# Patient Record
Sex: MALE | Race: AMERICAN INDIAN OR ALASKA NATIVE | NOT HISPANIC OR LATINO
[De-identification: names, ages, dates, MRNs, and addresses within clinical notes are randomized per-mention and may not be internally consistent; named-entity substitution may affect disease eponyms.]

---

## 2024-01-27 ENCOUNTER — NON-APPOINTMENT (OUTPATIENT)
Age: 44
End: 2024-01-27

## 2024-02-24 ENCOUNTER — EMERGENCY (EMERGENCY)
Facility: HOSPITAL | Age: 44
LOS: 1 days | Discharge: HOME | End: 2024-02-24
Attending: STUDENT IN AN ORGANIZED HEALTH CARE EDUCATION/TRAINING PROGRAM | Admitting: STUDENT IN AN ORGANIZED HEALTH CARE EDUCATION/TRAINING PROGRAM
Payer: SELF-PAY

## 2024-02-24 VITALS
OXYGEN SATURATION: 99 % | HEART RATE: 87 BPM | HEIGHT: 66 IN | RESPIRATION RATE: 18 BRPM | DIASTOLIC BLOOD PRESSURE: 106 MMHG | WEIGHT: 160.06 LBS | TEMPERATURE: 98 F | SYSTOLIC BLOOD PRESSURE: 164 MMHG

## 2024-02-24 LAB
ALBUMIN SERPL ELPH-MCNC: 4.3 G/DL — SIGNIFICANT CHANGE UP (ref 3.3–5)
ALP SERPL-CCNC: 87 U/L — SIGNIFICANT CHANGE UP (ref 40–120)
ALT FLD-CCNC: 30 U/L — SIGNIFICANT CHANGE UP (ref 12–78)
ANION GAP SERPL CALC-SCNC: 6 MMOL/L — SIGNIFICANT CHANGE UP (ref 5–17)
AST SERPL-CCNC: 17 U/L — SIGNIFICANT CHANGE UP (ref 15–37)
BASOPHILS # BLD AUTO: 0.03 K/UL — SIGNIFICANT CHANGE UP (ref 0–0.2)
BASOPHILS NFR BLD AUTO: 0.4 % — SIGNIFICANT CHANGE UP (ref 0–2)
BILIRUB SERPL-MCNC: 0.4 MG/DL — SIGNIFICANT CHANGE UP (ref 0.2–1.2)
BUN SERPL-MCNC: 12 MG/DL — SIGNIFICANT CHANGE UP (ref 7–23)
CALCIUM SERPL-MCNC: 9.5 MG/DL — SIGNIFICANT CHANGE UP (ref 8.5–10.1)
CHLORIDE SERPL-SCNC: 109 MMOL/L — HIGH (ref 96–108)
CO2 SERPL-SCNC: 27 MMOL/L — SIGNIFICANT CHANGE UP (ref 22–31)
CREAT SERPL-MCNC: 0.97 MG/DL — SIGNIFICANT CHANGE UP (ref 0.5–1.3)
EGFR: 98 ML/MIN/1.73M2 — SIGNIFICANT CHANGE UP
EOSINOPHIL # BLD AUTO: 0.16 K/UL — SIGNIFICANT CHANGE UP (ref 0–0.5)
EOSINOPHIL NFR BLD AUTO: 2.3 % — SIGNIFICANT CHANGE UP (ref 0–6)
ETHANOL SERPL-MCNC: <10 MG/DL — SIGNIFICANT CHANGE UP (ref 0–10)
GLUCOSE SERPL-MCNC: 154 MG/DL — HIGH (ref 70–99)
HCT VFR BLD CALC: 45.4 % — SIGNIFICANT CHANGE UP (ref 39–50)
HGB BLD-MCNC: 15.2 G/DL — SIGNIFICANT CHANGE UP (ref 13–17)
IMM GRANULOCYTES NFR BLD AUTO: 0.3 % — SIGNIFICANT CHANGE UP (ref 0–0.9)
LYMPHOCYTES # BLD AUTO: 2.36 K/UL — SIGNIFICANT CHANGE UP (ref 1–3.3)
LYMPHOCYTES # BLD AUTO: 34.6 % — SIGNIFICANT CHANGE UP (ref 13–44)
MCHC RBC-ENTMCNC: 26.7 PG — LOW (ref 27–34)
MCHC RBC-ENTMCNC: 33.5 GM/DL — SIGNIFICANT CHANGE UP (ref 32–36)
MCV RBC AUTO: 79.6 FL — LOW (ref 80–100)
MONOCYTES # BLD AUTO: 0.44 K/UL — SIGNIFICANT CHANGE UP (ref 0–0.9)
MONOCYTES NFR BLD AUTO: 6.5 % — SIGNIFICANT CHANGE UP (ref 2–14)
NEUTROPHILS # BLD AUTO: 3.81 K/UL — SIGNIFICANT CHANGE UP (ref 1.8–7.4)
NEUTROPHILS NFR BLD AUTO: 55.9 % — SIGNIFICANT CHANGE UP (ref 43–77)
NRBC # BLD: 0 /100 WBCS — SIGNIFICANT CHANGE UP (ref 0–0)
PLATELET # BLD AUTO: 217 K/UL — SIGNIFICANT CHANGE UP (ref 150–400)
POTASSIUM SERPL-MCNC: 4.3 MMOL/L — SIGNIFICANT CHANGE UP (ref 3.5–5.3)
POTASSIUM SERPL-SCNC: 4.3 MMOL/L — SIGNIFICANT CHANGE UP (ref 3.5–5.3)
PROT SERPL-MCNC: 8.1 G/DL — SIGNIFICANT CHANGE UP (ref 6–8.3)
RBC # BLD: 5.7 M/UL — SIGNIFICANT CHANGE UP (ref 4.2–5.8)
RBC # FLD: 13.6 % — SIGNIFICANT CHANGE UP (ref 10.3–14.5)
SODIUM SERPL-SCNC: 142 MMOL/L — SIGNIFICANT CHANGE UP (ref 135–145)
TROPONIN I, HIGH SENSITIVITY RESULT: 3.2 NG/L — SIGNIFICANT CHANGE UP
WBC # BLD: 6.82 K/UL — SIGNIFICANT CHANGE UP (ref 3.8–10.5)
WBC # FLD AUTO: 6.82 K/UL — SIGNIFICANT CHANGE UP (ref 3.8–10.5)

## 2024-02-24 PROCEDURE — 36415 COLL VENOUS BLD VENIPUNCTURE: CPT

## 2024-02-24 PROCEDURE — 93010 ELECTROCARDIOGRAM REPORT: CPT

## 2024-02-24 PROCEDURE — 93005 ELECTROCARDIOGRAM TRACING: CPT

## 2024-02-24 PROCEDURE — 80307 DRUG TEST PRSMV CHEM ANLYZR: CPT

## 2024-02-24 PROCEDURE — 99285 EMERGENCY DEPT VISIT HI MDM: CPT

## 2024-02-24 PROCEDURE — 71046 X-RAY EXAM CHEST 2 VIEWS: CPT

## 2024-02-24 PROCEDURE — 80053 COMPREHEN METABOLIC PANEL: CPT

## 2024-02-24 PROCEDURE — 84484 ASSAY OF TROPONIN QUANT: CPT

## 2024-02-24 PROCEDURE — 71046 X-RAY EXAM CHEST 2 VIEWS: CPT | Mod: 26

## 2024-02-24 PROCEDURE — 85025 COMPLETE CBC W/AUTO DIFF WBC: CPT

## 2024-02-24 RX ORDER — ACETAMINOPHEN 500 MG
650 TABLET ORAL ONCE
Refills: 0 | Status: COMPLETED | OUTPATIENT
Start: 2024-02-24 | End: 2024-02-24

## 2024-02-24 RX ADMIN — Medication 650 MILLIGRAM(S): at 20:29

## 2024-02-24 NOTE — ED PROVIDER NOTE - CARE PROVIDER_API CALL
William Ibarra  Cardiovascular Disease  [address] [place] [address]  Phone: [phone]  Fax: [phone]  Follow Up Time:

## 2024-02-24 NOTE — ED PROVIDER NOTE - NSICDXFAMILYHX_GEN_ALL_CORE_FT
FAMILY HISTORY:  Family history of hypertension    Sibling  Still living? Unknown  Family history of diabetes mellitus, Age at diagnosis: Age Unknown

## 2024-02-24 NOTE — ED PROVIDER NOTE - CLINICAL SUMMARY MEDICAL DECISION MAKING FREE TEXT BOX
I, Lico Grace MD, have seen and examined the patient on the date of service.  I agree with the MARIANO's assessment as written, with exceptions or additions as noted below or in a separate note.  Patient with history of alcohol use disorder, hypertension is presenting with concern for headaches, as well as hypertension.  Symptoms have been present for the past 2 days.  He has been following with his primary care doctor with no changes to medication management yet.  States that he has had increased intake and salt over the past few days as well.  On exam today he has no focal neurologic deficits.  Does not appear to be in any acute withdrawal.  States that he has never had symptoms of withdrawal before.  EKG per my independent interpretation is rate 84, normal sinus rhythm, normal axis, no ST segment elevations consistent with ischemia.  Symptoms potentially related to hypertension.  No concern for intracranial pathology at this time given a nonfocal exam.  Will assess for ACS with troponin and his EKG given concern for hypertension and his medical history.  Possible musculoskeletal component to his shoulder pain versus primary tension headache.  Plan on lab work, medications.  Wife at bedside able to provide most history due to language barrier

## 2024-02-24 NOTE — ED PROVIDER NOTE - NSFOLLOWUPINSTRUCTIONS_ED_ALL_ED_FT
Follow up with your primary care physician within 2-3 days. Follow-up with the cardiologist as discussed call for an appointment. Take the copy of your test results you were given in the emergency room for your doctor to review.     Continue taking your blood pressure medications    Stay hydrated    Return to the ER if your symptoms worsen or for any other medical emergencies  ************    Hypertension    Hypertension, commonly called high blood pressure, is when the force of blood pumping through your arteries is too strong. Hypertension forces your heart to work harder to pump blood. Your arteries may become narrow or stiff. Having untreated or uncontrolled hypertension for a long period of time can cause heart attack, stroke, kidney disease, and other problems. If started on a medication, take exactly as prescribed by your health care professional. Maintain a healthy lifestyle and follow up with your primary care physician.    SEEK IMMEDIATE MEDICAL CARE IF YOU HAVE ANY OF THE FOLLOWING SYMPTOMS: severe headache, confusion, chest pain, abdominal pain, vomiting, or shortness of breath.

## 2024-02-24 NOTE — ED ADULT NURSE NOTE - OBJECTIVE STATEMENT
Patient presents to the ED with c/o a continuous headache for 4 days. denies any blurry vision or nausea/vomiting. No labored breathing noted. able to move all extremities. Wife at bedside.

## 2024-02-24 NOTE — ED PROVIDER NOTE - ATTENDING APP SHARED VISIT CONTRIBUTION OF CARE
I, Lico Grace MD, have seen and examined the patient on the date of service.  I agree with the MARIANO's assessment as written, with exceptions or additions as noted below or in a separate note.

## 2024-02-24 NOTE — ED PROVIDER NOTE - PROGRESS NOTE DETAILS
JORGE Barbosa: Labs reviewed, chest x-ray images reviewed–no acute findings.  Patient counseled on not drinking alcohol since decreasing his salt intake.  Will DC with instructions to follow-up with his PCP and cardiology referral for follow-up

## 2024-02-24 NOTE — ED PROVIDER NOTE - PHYSICAL EXAMINATION
Gen: Well appearing in NAD.   ENT: oral mucosa moist   Head: atraumatic  Heart: s1/s2, RRR  Lung: CTA b/l,   Abd: soft, NT/ND, no rebound or guarding,  Msk: no pedal edema   Neuro: AAO x3, patient moving all extremity equally, no focal neuro deficits noted. No hand tremors or tongue fasciculations  Skin: Normal for race.  Psych: Alert and oriented

## 2024-02-24 NOTE — ED ADULT TRIAGE NOTE - CHIEF COMPLAINT QUOTE
Patient ambulatory to triage with complaints of headache left shoulder pain and high blood pressure x 3 days.  Patient was advised by PCP to go to hospital for blood work.

## 2024-02-24 NOTE — ED PROVIDER NOTE - PATIENT PORTAL LINK FT
You can access the FollowMyHealth Patient Portal offered by [facility] by registering at the following website: [email or web address]. By joining [employer]’s FollowMyHealth portal, you will also be able to view your health information using other applications (apps) compatible with our system.

## 2024-02-24 NOTE — ED PROVIDER NOTE - OBJECTIVE STATEMENT
43-year-old male past medical history of hypertension on losartan 50 mg once a day, dm, high cholesterol presents to the ED with complaints of elevated blood pressure intermittently x 2 weeks and constant 4 days ago.  Reports he has been having headache last shoulder and arm pain and some mild chest pain with the elevated blood pressures.  Reports his blood pressures have been 158/106, 162/108.  Yesterday he had soup with was salty reports he took a second dose of his blood pressure medications last night.  Patient also reports he drinks 1 /2 glass of alcohol daily, reports this week he only had 1 glass.  He denies any history of alcohol withdrawal 43-year-old male past medical history of hypertension on losartan 50 mg once a day, dm, high cholesterol presents to the ED with complaints of elevated blood pressure intermittently x 2 weeks more constant 4 days ago.  Reports he has been having headache, left shoulder and arm pain and some mild chest pain with the elevated blood pressures.  Reports his blood pressures have been 158/106, 162/108.  Yesterday he ate soup which was salty, reports he took a second dose of his blood pressure medications last night.  Patient also reports he drinks 1 /2 glass of alcohol daily, reports this week he only had 1 glass.  He denies any history of alcohol withdrawal.

## 2024-03-01 ENCOUNTER — EMERGENCY (EMERGENCY)
Facility: HOSPITAL | Age: 44
LOS: 1 days | Discharge: HOME | End: 2024-03-01
Attending: STUDENT IN AN ORGANIZED HEALTH CARE EDUCATION/TRAINING PROGRAM | Admitting: STUDENT IN AN ORGANIZED HEALTH CARE EDUCATION/TRAINING PROGRAM
Payer: SELF-PAY

## 2024-03-01 VITALS
TEMPERATURE: 98 F | HEIGHT: 66 IN | RESPIRATION RATE: 18 BRPM | DIASTOLIC BLOOD PRESSURE: 108 MMHG | HEART RATE: 72 BPM | WEIGHT: 160.06 LBS | SYSTOLIC BLOOD PRESSURE: 156 MMHG | OXYGEN SATURATION: 99 %

## 2024-03-01 LAB
ALBUMIN SERPL ELPH-MCNC: 4.2 G/DL — SIGNIFICANT CHANGE UP (ref 3.3–5)
ALP SERPL-CCNC: 83 U/L — SIGNIFICANT CHANGE UP (ref 40–120)
ALT FLD-CCNC: 27 U/L — SIGNIFICANT CHANGE UP (ref 12–78)
ANION GAP SERPL CALC-SCNC: 6 MMOL/L — SIGNIFICANT CHANGE UP (ref 5–17)
AST SERPL-CCNC: 16 U/L — SIGNIFICANT CHANGE UP (ref 15–37)
BASOPHILS # BLD AUTO: 0.03 K/UL — SIGNIFICANT CHANGE UP (ref 0–0.2)
BASOPHILS NFR BLD AUTO: 0.5 % — SIGNIFICANT CHANGE UP (ref 0–2)
BILIRUB SERPL-MCNC: 0.5 MG/DL — SIGNIFICANT CHANGE UP (ref 0.2–1.2)
BUN SERPL-MCNC: 14 MG/DL — SIGNIFICANT CHANGE UP (ref 7–23)
CALCIUM SERPL-MCNC: 9.4 MG/DL — SIGNIFICANT CHANGE UP (ref 8.5–10.1)
CHLORIDE SERPL-SCNC: 108 MMOL/L — SIGNIFICANT CHANGE UP (ref 96–108)
CO2 SERPL-SCNC: 29 MMOL/L — SIGNIFICANT CHANGE UP (ref 22–31)
CREAT SERPL-MCNC: 0.97 MG/DL — SIGNIFICANT CHANGE UP (ref 0.5–1.3)
EGFR: 98 ML/MIN/1.73M2 — SIGNIFICANT CHANGE UP
EOSINOPHIL # BLD AUTO: 0.21 K/UL — SIGNIFICANT CHANGE UP (ref 0–0.5)
EOSINOPHIL NFR BLD AUTO: 3.4 % — SIGNIFICANT CHANGE UP (ref 0–6)
GLUCOSE SERPL-MCNC: 130 MG/DL — HIGH (ref 70–99)
HCT VFR BLD CALC: 45.2 % — SIGNIFICANT CHANGE UP (ref 39–50)
HGB BLD-MCNC: 15.2 G/DL — SIGNIFICANT CHANGE UP (ref 13–17)
IMM GRANULOCYTES NFR BLD AUTO: 0.2 % — SIGNIFICANT CHANGE UP (ref 0–0.9)
LYMPHOCYTES # BLD AUTO: 2.46 K/UL — SIGNIFICANT CHANGE UP (ref 1–3.3)
LYMPHOCYTES # BLD AUTO: 40 % — SIGNIFICANT CHANGE UP (ref 13–44)
MCHC RBC-ENTMCNC: 26.9 PG — LOW (ref 27–34)
MCHC RBC-ENTMCNC: 33.6 GM/DL — SIGNIFICANT CHANGE UP (ref 32–36)
MCV RBC AUTO: 79.9 FL — LOW (ref 80–100)
MONOCYTES # BLD AUTO: 0.53 K/UL — SIGNIFICANT CHANGE UP (ref 0–0.9)
MONOCYTES NFR BLD AUTO: 8.6 % — SIGNIFICANT CHANGE UP (ref 2–14)
NEUTROPHILS # BLD AUTO: 2.91 K/UL — SIGNIFICANT CHANGE UP (ref 1.8–7.4)
NEUTROPHILS NFR BLD AUTO: 47.3 % — SIGNIFICANT CHANGE UP (ref 43–77)
NRBC # BLD: 0 /100 WBCS — SIGNIFICANT CHANGE UP (ref 0–0)
PLATELET # BLD AUTO: 216 K/UL — SIGNIFICANT CHANGE UP (ref 150–400)
POTASSIUM SERPL-MCNC: 4.5 MMOL/L — SIGNIFICANT CHANGE UP (ref 3.5–5.3)
POTASSIUM SERPL-SCNC: 4.5 MMOL/L — SIGNIFICANT CHANGE UP (ref 3.5–5.3)
PROT SERPL-MCNC: 7.9 G/DL — SIGNIFICANT CHANGE UP (ref 6–8.3)
RBC # BLD: 5.66 M/UL — SIGNIFICANT CHANGE UP (ref 4.2–5.8)
RBC # FLD: 13.6 % — SIGNIFICANT CHANGE UP (ref 10.3–14.5)
SODIUM SERPL-SCNC: 143 MMOL/L — SIGNIFICANT CHANGE UP (ref 135–145)
TROPONIN I, HIGH SENSITIVITY RESULT: 3.5 NG/L — SIGNIFICANT CHANGE UP
WBC # BLD: 6.15 K/UL — SIGNIFICANT CHANGE UP (ref 3.8–10.5)
WBC # FLD AUTO: 6.15 K/UL — SIGNIFICANT CHANGE UP (ref 3.8–10.5)

## 2024-03-01 PROCEDURE — 93005 ELECTROCARDIOGRAM TRACING: CPT

## 2024-03-01 PROCEDURE — 93971 EXTREMITY STUDY: CPT

## 2024-03-01 PROCEDURE — 99285 EMERGENCY DEPT VISIT HI MDM: CPT | Mod: 25

## 2024-03-01 PROCEDURE — 93971 EXTREMITY STUDY: CPT | Mod: 26,LT

## 2024-03-01 PROCEDURE — 99053 MED SERV 10PM-8AM 24 HR FAC: CPT

## 2024-03-01 PROCEDURE — 80053 COMPREHEN METABOLIC PANEL: CPT

## 2024-03-01 PROCEDURE — 85025 COMPLETE CBC W/AUTO DIFF WBC: CPT

## 2024-03-01 PROCEDURE — 99284 EMERGENCY DEPT VISIT MOD MDM: CPT

## 2024-03-01 PROCEDURE — 93010 ELECTROCARDIOGRAM REPORT: CPT

## 2024-03-01 PROCEDURE — 36415 COLL VENOUS BLD VENIPUNCTURE: CPT

## 2024-03-01 PROCEDURE — 84484 ASSAY OF TROPONIN QUANT: CPT

## 2024-03-01 RX ORDER — CYCLOBENZAPRINE HYDROCHLORIDE 10 MG/1
10 TABLET, FILM COATED ORAL ONCE
Refills: 0 | Status: COMPLETED | OUTPATIENT
Start: 2024-03-01 | End: 2024-03-01

## 2024-03-01 RX ORDER — CYCLOBENZAPRINE HYDROCHLORIDE 10 MG/1
1 TABLET, FILM COATED ORAL
Qty: 15 | Refills: 0
Start: 2024-03-01 | End: 2024-03-05

## 2024-03-01 RX ORDER — IBUPROFEN 200 MG
600 TABLET ORAL ONCE
Refills: 0 | Status: COMPLETED | OUTPATIENT
Start: 2024-03-01 | End: 2024-03-01

## 2024-03-01 RX ORDER — LOSARTAN POTASSIUM 100 MG/1
0 TABLET, FILM COATED ORAL
Refills: 0 | DISCHARGE

## 2024-03-01 RX ADMIN — Medication 600 MILLIGRAM(S): at 08:32

## 2024-03-01 RX ADMIN — CYCLOBENZAPRINE HYDROCHLORIDE 10 MILLIGRAM(S): 10 TABLET, FILM COATED ORAL at 08:32

## 2024-03-01 NOTE — ED ADULT NURSE NOTE - CADM POA URETHRAL CATHETER

## 2024-03-01 NOTE — ED PROVIDER NOTE - CLINICAL SUMMARY MEDICAL DECISION MAKING FREE TEXT BOX
43-year-old male past medical history of hypertension, dm, high cholesterol pw left arm pain. pt was seen last week for same, chest pain, htn, left arm pain. trop was negative and pt was dcd. pt states since iv was placed in left arm the pain in his arm increased. the pain is in his entire arm and radiates to side of head. no weaknses noted but has pain when he uses the arm. no redness or warmth at IV site. no fevers or chills, sometimes pain radiates to top of chest   likely cervical radiculopathy, will treat with nsaid and muscle relaxant  no signs of thrombophlebitis but will cehck upper extremity DVT study  given also with chest pain will ro acs, but story not consistent  if labs and imaging wnl, will dc with ortho spine fu

## 2024-03-01 NOTE — ED PROVIDER NOTE - RAPID ASSESSMENT
43-year-old male past medical history of hypertension, dm, high cholesterol pw left arm pain. pt was seen last week for same, chest pain, htn, left arm pain. trop was negative and pt was dcd. pt states since iv was placed in left arm the pain in his arm increased. the pain is in his entire arm and radiates to side of head. no weaknses noted but has pain when he uses the arm. no redness or warmth at IV site. no fevers or chills, sometimes pain radiates to top of chest

## 2024-03-01 NOTE — ED PROVIDER NOTE - PHYSICAL EXAMINATION
Gen: Well appearing in NAD  Head: NC/AT  Neck: trachea midline  Resp:  No distress  Ext: no deformities  Neuro:  A&O appears non focal, no motor or sensory deficit of upper extremity   Skin:  Warm and dry as visualized, no redness or warmth   Psych:  Normal affect and mood

## 2024-03-01 NOTE — ED ADULT NURSE NOTE - OBJECTIVE STATEMENT
Pt here w c/o sore throat, runny nose, sneezing, mild cough x Saturday night. Pt here w family member w similar symptoms. States having  Multiple family members sick since Thanksgiving dinner.
Pt states that he has been having left arm pain since having an IV placed in his left arm on 2/25. Pt is also c/o left CP at this time. Pt denies SOB, N, V, fever and chills. pt in no acute distress. pt updated on plan of care.

## 2024-03-01 NOTE — ED ADULT NURSE NOTE - NSFALLUNIVINTERV_ED_ALL_ED
Bed/Stretcher in lowest position, wheels locked, appropriate side rails in place/Call bell, personal items and telephone in reach/Instruct patient to call for assistance before getting out of bed/chair/stretcher/Non-slip footwear applied when patient is off stretcher/[place] to call system/Physically safe environment - no spills, clutter or unnecessary equipment/Purposeful proactive rounding/Room/bathroom lighting operational, light cord in reach

## 2024-03-01 NOTE — ED ADULT TRIAGE NOTE - CHIEF COMPLAINT QUOTE
Pt states pain to L arm radiating to head S/P IV placement last time he was in ED.  Feb 24/24.  No noted redness, swelling.

## 2024-03-01 NOTE — ED PROVIDER NOTE - PROGRESS NOTE DETAILS
imprvoed, trop and US negative, ayaka wang, will rec to take nsaids, flexeril and fu with pcpa nd spine

## 2024-03-01 NOTE — ED ADULT TRIAGE NOTE - PATIENT ON (OXYGEN DELIVERY METHOD)
hand/wrist weights. Pt verbalized understanding. Used 1lb weight for shoulder/elbow/wrist flexion and extension, horizontal ab/adduction, and pronation/supination x 10 reps each. Pt with verbal cues to complete as instructed. Instructed to complete 2-3 times per day. Provided written instructions on printout for wrist weights. Fine motor activity connecting small plastic beads for 10 mins. Pt able to connect 48 beads. Pt with tremors in hands. Comments: Pt donning compression glove to L hand. No concerns reported. Pt stated has not contacted physician about headaches. Pt stated also needs to contact physician about increased shaking in [facility].      Pt with no more visits. Pt stated will call next week. Recommended to schedule 6 more appointments for complete POC. Pt verbalized understanding. Assessment:   Pt tolerated treatment fair. Pt with report of some pain during activities, but reported no pain at end of session. Pt with increased time and effort and decreased endurance to complete all activities, tremors in BUE noted. Plan:   Continue POC    Goals:     Long term goals  Time Frame for Long term goals : 2 x/week for 4 weeks or 8 visits  Long term goal 1: Patient will be independent with all recommended strategies to decrease edema throughout left UE. Long term goal 2: Patient will engage in functional/therapeutic activities to decrease pain in left UE to 0-3/10 (now as high as 8/10) to increase participation in functional tasks. Long term goal 3: Patient will increase left UE strength by 1/2 MMT grade to increase efficiency with ADL/IADLs. Long term goal 4: Patient will increase left hand fine motor coordination by a 10 second improvement on 9-HPT for increase ease of ADL/IADL tasks. Long term goal 5: Patient will be independent with all recommended HEP for ROM, edema and pain management, strength, and coordination.      Aleyda Hsieh, OTR/L   7/1/2021  11:59 AM
room air

## 2024-03-01 NOTE — ED PROVIDER NOTE - PATIENT PORTAL LINK FT
You can access the FollowMyHealth Patient Portal offered by [facility] by registering at the following website: [email or web address]. By joining [employer]’s FollowMyHealth portal, you will also be able to view your health information using other applications (apps) compatible with our system.

## 2024-03-01 NOTE — ED PROVIDER NOTE - NSFOLLOWUPINSTRUCTIONS_ED_ALL_ED_FT
1. TAKE ALL MEDICATIONS AS DIRECTED.    2. FOR PAIN OR FEVER YOU CAN TAKE IBUPROFEN (MOTRIN, ADVIL) OR ACETAMINOPHEN (TYLENOL) AS NEEDED, AS DIRECTED ON PACKAGING.  Take flexeril every 8 hours as needed for pain  3. FOLLOW UP WITH YOUR PRIMARY DOCTOR WITHIN 5 DAYS AS DIRECTED. Follow up with orthopedics   4. IF YOU HAD LABS OR IMAGING DONE, YOU WERE GIVEN COPIES OF ALL LABS AND/OR IMAGING RESULTS FROM YOUR ER VISIT--PLEASE TAKE THEM WITH YOU TO YOUR FOLLOW UP APPOINTMENTS.  5. IF NEEDED, CALL PATIENT ACCESS SERVICES AT [phone] TO FIND A PRIMARY CARE PHYSICIAN.  OR CALL [phone] TO MAKE AN APPOINTMENT WITH THE CLINIC.  6. RETURN TO THE ER FOR ANY WORSENING SYMPTOMS OR CONCERNS.        Log Out.  [employer] CareNotes®  :  [facility]        ARM PAIN - AfterCare(R) Instructions(ER/ED)    Arm Pain    WHAT YOU NEED TO KNOW:    Your arm pain may be caused by a number of conditions. Examples include arthritis, nerve problems, or an awkward position while you sleep. X-rays did not show a broken bone in your arm or wrist. Arm pain may be a sign of a serious condition that needs immediate care, such as a heart attack.    DISCHARGE INSTRUCTIONS:    Call your local emergency number (911 in the [place]) for any of the following:    You have any of the following signs of a heart attack:  Squeezing, pressure, or pain in your chest    You may also have any of the following:  Discomfort or pain in your back, neck, jaw, stomach, or arm    Shortness of breath    Nausea or vomiting    Lightheadedness or a sudden cold sweat    Return to the emergency department if:    You have severe pain, or pain that spreads from your arm to other areas.    You have swelling, tingling, or numbness in your hand or fingers, or the skin turns blue.    You cannot move your arm.  Call your doctor if:    You have questions or concerns about your condition or care.    Medicines: You may need any of the following:    Prescription pain medicine may be given. Ask your healthcare provider how to take this medicine safely. Some prescription pain medicines contain acetaminophen. Do not take other medicines that contain acetaminophen without talking to your healthcare provider. Too much acetaminophen may cause liver damage. Prescription pain medicine may cause constipation. Ask your healthcare provider how to prevent or treat constipation.    NSAIDs, such as ibuprofen, help decrease swelling, pain, and fever. This medicine is available with or without a doctor's order. NSAIDs can cause stomach bleeding or kidney problems in certain people. If you take blood thinner medicine, always ask your healthcare provider if NSAIDs are safe for you. Always read the medicine label and follow directions.    Take your medicine as directed. Contact your healthcare provider if you think your medicine is not helping or if you have side effects. Tell your provider if you are allergic to any medicine. Keep a list of the medicines, vitamins, and herbs you take. Include the amounts, and when and why you take them. Bring the list or the pill bottles to follow-up visits. Carry your medicine list with you in case of an emergency.  Self-care:    Rest your arm as directed. A sling may be used to keep your arm from moving while it heals.    Apply ice as directed. Ice helps decrease pain and swelling. Ice may also help prevent tissue damage. Use an ice pack, or put crushed ice in a plastic bag. Cover it with a towel. Apply it to your arm for 20 minutes every few hours, or as directed. Ask how many times to apply ice each day, and for how many days.    Elevate your arm above the level of your heart as often as you can. This will help decrease swelling and pain. Prop your arm on pillows or blankets to keep the area elevated comfortably.        Adjust your position if you work in front of a computer. You may need arm or wrist supports or change the height of your chair.    Keep a pain record. Write down when your pain happens and how severe it is. Include any other symptoms you have with your pain. A record will help you keep track of pain cycles. Bring the record with you to your follow-up visits. It may also help your healthcare provider find out what is causing your pain.  Follow up with your doctor as directed: You may need physical therapy. You may need to see an orthopedic specialist. Write down your questions so you remember to ask them during your visits.    © [employer] 1973, 2024    	  back to top            © [employer] 1973, 2024

## 2024-03-22 PROBLEM — Z00.00 ENCOUNTER FOR PREVENTIVE HEALTH EXAMINATION: Status: ACTIVE | Noted: 2024-03-22

## 2024-03-27 ENCOUNTER — APPOINTMENT (OUTPATIENT)
Dept: ORTHOPEDIC SURGERY | Facility: HOSPITAL | Age: 44
End: 2024-03-27

## 2024-03-27 ENCOUNTER — APPOINTMENT (OUTPATIENT)
Dept: RADIOLOGY | Facility: HOSPITAL | Age: 44
End: 2024-03-27

## 2024-03-27 ENCOUNTER — TRANSCRIPTION ENCOUNTER (OUTPATIENT)
Age: 44
End: 2024-03-27

## 2024-03-27 ENCOUNTER — OUTPATIENT (OUTPATIENT)
Dept: OUTPATIENT SERVICES | Facility: HOSPITAL | Age: 44
LOS: 1 days | End: 2024-03-27
Payer: SELF-PAY

## 2024-03-27 ENCOUNTER — RESULT REVIEW (OUTPATIENT)
Age: 44
End: 2024-03-27

## 2024-03-27 VITALS
TEMPERATURE: 97.7 F | DIASTOLIC BLOOD PRESSURE: 97 MMHG | WEIGHT: 162 LBS | BODY MASS INDEX: 26.03 KG/M2 | SYSTOLIC BLOOD PRESSURE: 151 MMHG | HEART RATE: 85 BPM | HEIGHT: 66 IN

## 2024-03-27 DIAGNOSIS — G89.29 LOW BACK PAIN, UNSPECIFIED: ICD-10-CM

## 2024-03-27 DIAGNOSIS — M54.50 LOW BACK PAIN, UNSPECIFIED: ICD-10-CM

## 2024-03-27 DIAGNOSIS — M54.12 RADICULOPATHY, CERVICAL REGION: ICD-10-CM

## 2024-03-27 PROCEDURE — 72100 X-RAY EXAM L-S SPINE 2/3 VWS: CPT | Mod: 26

## 2024-03-27 PROCEDURE — 72040 X-RAY EXAM NECK SPINE 2-3 VW: CPT | Mod: 26

## 2024-03-27 RX ORDER — MELOXICAM 7.5 MG/1
7.5 TABLET ORAL DAILY
Qty: 30 | Refills: 1 | Status: ACTIVE | COMMUNITY
Start: 2024-03-27 | End: 1900-01-01

## 2024-03-28 DIAGNOSIS — M54.50 LOW BACK PAIN, UNSPECIFIED: ICD-10-CM

## 2024-03-28 DIAGNOSIS — M54.12 RADICULOPATHY, CERVICAL REGION: ICD-10-CM

## 2024-03-29 PROBLEM — M54.50 LUMBAGO: Status: ACTIVE | Noted: 2024-03-29

## 2024-03-29 PROBLEM — M54.50 CHRONIC RIGHT-SIDED LOW BACK PAIN, UNSPECIFIED WHETHER SCIATICA PRESENT: Status: ACTIVE | Noted: 2024-03-27

## 2024-03-30 RX ORDER — MELOXICAM 7.5 MG/1
7.5 TABLET ORAL
Qty: 30 | Refills: 1 | Status: ACTIVE | COMMUNITY
Start: 2024-03-30 | End: 1900-01-01

## 2024-06-20 NOTE — ED ADULT TRIAGE NOTE - SOURCE OF INFORMATION
[FreeTextEntry1] : 76 yo M with LEFT testicular injury and epididymitis, complicated by urinary retention. Suspect existing subclinical injection was spread by trauma, reseulting in prostatitis. Recommend Cipro x2 weeks, repeat 2 weeks course if sx not completely resolved. Will attempt TOV in 4 days. 
Patient

## 2024-06-26 ENCOUNTER — APPOINTMENT (OUTPATIENT)
Dept: ORTHOPEDIC SURGERY | Facility: HOSPITAL | Age: 44
End: 2024-06-26

## 2024-08-12 ENCOUNTER — EMERGENCY (EMERGENCY)
Facility: HOSPITAL | Age: 44
LOS: 1 days | Discharge: HOME | End: 2024-08-12
Attending: EMERGENCY MEDICINE | Admitting: EMERGENCY MEDICINE
Payer: MEDICAID

## 2024-08-12 VITALS
RESPIRATION RATE: 16 BRPM | DIASTOLIC BLOOD PRESSURE: 94 MMHG | OXYGEN SATURATION: 98 % | SYSTOLIC BLOOD PRESSURE: 148 MMHG | HEART RATE: 63 BPM | TEMPERATURE: 98 F

## 2024-08-12 VITALS
HEART RATE: 68 BPM | WEIGHT: 159.84 LBS | TEMPERATURE: 99 F | HEIGHT: 66 IN | RESPIRATION RATE: 16 BRPM | DIASTOLIC BLOOD PRESSURE: 99 MMHG | SYSTOLIC BLOOD PRESSURE: 166 MMHG | OXYGEN SATURATION: 99 %

## 2024-08-12 PROCEDURE — 99284 EMERGENCY DEPT VISIT MOD MDM: CPT

## 2024-08-12 PROCEDURE — 99283 EMERGENCY DEPT VISIT LOW MDM: CPT

## 2024-08-12 PROCEDURE — 96372 THER/PROPH/DIAG INJ SC/IM: CPT

## 2024-08-12 RX ORDER — SITAGLIPTIN AND METFORMIN HYDROCHLORIDE 50; 500 MG/1; MG/1
1 TABLET, FILM COATED ORAL
Refills: 0 | DISCHARGE

## 2024-08-12 RX ORDER — LOSARTAN POTASSIUM 50 MG/1
1 TABLET, FILM COATED ORAL
Refills: 0 | DISCHARGE

## 2024-08-12 RX ORDER — LIDOCAINE 5% 5 G/100G
1 CREAM TOPICAL ONCE
Refills: 0 | Status: COMPLETED | OUTPATIENT
Start: 2024-08-12 | End: 2024-08-12

## 2024-08-12 RX ORDER — GABAPENTIN 400 MG/1
300 CAPSULE ORAL ONCE
Refills: 0 | Status: COMPLETED | OUTPATIENT
Start: 2024-08-12 | End: 2024-08-12

## 2024-08-12 RX ORDER — FENOFIBRATE 30 MG/1
1 CAPSULE ORAL
Refills: 0 | DISCHARGE

## 2024-08-12 RX ORDER — KETOROLAC TROMETHAMINE 10 MG
30 TABLET ORAL ONCE
Refills: 0 | Status: DISCONTINUED | OUTPATIENT
Start: 2024-08-12 | End: 2024-08-12

## 2024-08-12 RX ORDER — PREDNISONE 10 MG/1
50 TABLET ORAL ONCE
Refills: 0 | Status: COMPLETED | OUTPATIENT
Start: 2024-08-12 | End: 2024-08-12

## 2024-08-12 RX ORDER — PREDNISONE 10 MG/1
2 TABLET ORAL
Qty: 12 | Refills: 0
Start: 2024-08-12 | End: 2024-08-17

## 2024-08-12 RX ORDER — EMPAGLIFLOZIN 25 MG/1
1 TABLET, FILM COATED ORAL
Refills: 0 | DISCHARGE

## 2024-08-12 RX ORDER — GABAPENTIN 400 MG/1
1 CAPSULE ORAL
Qty: 20 | Refills: 0
Start: 2024-08-12

## 2024-08-12 RX ORDER — ATORVASTATIN CALCIUM 40 MG/1
1 TABLET, FILM COATED ORAL
Refills: 0 | DISCHARGE

## 2024-08-12 RX ADMIN — GABAPENTIN 300 MILLIGRAM(S): 400 CAPSULE ORAL at 01:26

## 2024-08-12 RX ADMIN — Medication 30 MILLIGRAM(S): at 01:51

## 2024-08-12 RX ADMIN — LIDOCAINE 5% 1 PATCH: 5 CREAM TOPICAL at 01:27

## 2024-08-12 RX ADMIN — PREDNISONE 50 MILLIGRAM(S): 10 TABLET ORAL at 01:26

## 2024-08-12 RX ADMIN — Medication 30 MILLIGRAM(S): at 01:27

## 2024-08-12 NOTE — ED PROVIDER NOTE - OBJECTIVE STATEMENT
Patient presents with complaints of right lower back pain since yesterday.  Pain is most pronounced in his upper buttock and lower back.  Radiates down right leg.  Has some numbness in his right heel.  No difficulty urinating or moving his bowels.  Has increased pain with walking.  Patient has a history of similar pain in the past and has had x-rays of his back.  Patient took 400 mg of ibuprofen pain tonight.

## 2024-08-12 NOTE — ED PROVIDER NOTE - CLINICAL SUMMARY MEDICAL DECISION MAKING FREE TEXT BOX
Patient with back pain going down right leg consistent with sciatica.  History of same in the past.  Has had plain x-rays but no MRI.  Will treat pain with medications and start anti-inflammatories.  Will refer to back specialist and pain management.

## 2024-08-12 NOTE — ED PROVIDER NOTE - CARE PROVIDER_API CALL
Duy Norman  Orthopaedic Surgery  [address]  [place] [address]  Phone: [phone]  Fax: [phone]  Follow Up Time:     Rodrick Reyes  Spine Surgery  [address], [address]  [place] [address]  Phone: [phone]  Fax: [phone]  Follow Up Time:     [facility]  [address] [place] [address]  Phone: [phone]  Fax: [phone]  Follow Up Time:

## 2024-08-12 NOTE — ED PROVIDER NOTE - MUSCULOSKELETAL, MLM
Spine appears normal, range of motion is not limited, Normal strength/sensation/DTR's LEs. Normal SLR

## 2024-08-12 NOTE — ED PROVIDER NOTE - PROVIDER TOKENS
PROVIDER:[TOKEN:[[id number]]],PROVIDER:[TOKEN:[[id number]]],PROVIDER:[TOKEN:[[id number]]]

## 2024-08-12 NOTE — ED ADULT TRIAGE NOTE - BSA (M2)
Spoke with mom, updated demographics, informed of wait list, no custody agreement, mailed forms and resource list 1.82

## 2024-08-12 NOTE — ED PROVIDER NOTE - CARE PROVIDERS DIRECT ADDRESSES
[email or web address]

## 2024-08-12 NOTE — ED ADULT NURSE NOTE - NSFALLUNIVINTERV_ED_ALL_ED
Bed/Stretcher in lowest position, wheels locked, appropriate side rails in place/Call bell, personal items and telephone in reach/Instruct patient to call for assistance before getting out of bed/chair/stretcher/Non-slip footwear applied when patient is off stretcher/[place] to call system/Physically safe environment - no spills, clutter or unnecessary equipment/Purposeful proactive rounding/Room/bathroom lighting operational, light cord in reach

## 2024-08-12 NOTE — ED ADULT NURSE NOTE - OBJECTIVE STATEMENT
pt to ED with c/o right sided back pain that radiates down his right leg. pt reports having this same problem in the past. took ibuprofen prior to arrival. ambulated with steady gait unassisted. pt also reports numbness to right heel

## 2024-08-12 NOTE — ED ADULT NURSE NOTE - NSFALLRISKFACTORS_ED_ALL_ED
Long term goal 3: Demonstrate improved R hip abd PROM to 35 degrees  Long term goal 4: Demonstrate improved R hip abd strength to 4/5  Long term goal 5: Improve LEFS score to >45/80    Plan:   Visits per week:  2  # of weeks:  6    [x] Continue per plan of care [] Alter current plan (see comments)  [] Plan of care initiated [] Hold pending MD visit [] Discharge    Plan for Next Session:  Progress strength, endurance, flexibility    Electronically signed by:   Arron Santana No indicators present

## 2024-08-12 NOTE — ED PROVIDER NOTE - CONSTITUTIONAL, MLM
normal... Well appearing, awake, alert, oriented to person, place, time/situation and in no apparent distress. Uncomfortable on walking

## 2024-08-12 NOTE — ED PROVIDER NOTE - PATIENT PORTAL LINK FT
You can access the FollowMyHealth Patient Portal offered by Health system by registering at the following website: [email or web address]. By joining [employer]’s FollowMyHealth portal, you will also be able to view your health information using other applications (apps) compatible with our system.

## 2024-08-12 NOTE — ED PROVIDER NOTE - NSFOLLOWUPCLINICS_GEN_ALL_ED_FT
[facility]  Orthopedic Surgery  [address], [address] & 4th [place] [address]  Phone: [phone]  Fax: